# Patient Record
Sex: FEMALE | URBAN - METROPOLITAN AREA
[De-identification: names, ages, dates, MRNs, and addresses within clinical notes are randomized per-mention and may not be internally consistent; named-entity substitution may affect disease eponyms.]

---

## 2022-07-13 ENCOUNTER — OFFICE VISIT (OUTPATIENT)
Dept: URBAN - METROPOLITAN AREA CLINIC 35 | Facility: CLINIC | Age: 42
End: 2022-07-13
Payer: COMMERCIAL

## 2022-07-13 VITALS
WEIGHT: 239 LBS | BODY MASS INDEX: 45.12 KG/M2 | HEART RATE: 88 BPM | DIASTOLIC BLOOD PRESSURE: 78 MMHG | SYSTOLIC BLOOD PRESSURE: 120 MMHG | HEIGHT: 61 IN | OXYGEN SATURATION: 98 %

## 2022-07-13 DIAGNOSIS — K62.5 RECTAL BLEEDING: ICD-10-CM

## 2022-07-13 DIAGNOSIS — D50.0 IRON DEFICIENCY ANEMIA DUE TO CHRONIC BLOOD LOSS: ICD-10-CM

## 2022-07-13 DIAGNOSIS — R13.14 PHARYNGOESOPHAGEAL DYSPHAGIA: ICD-10-CM

## 2022-07-13 DIAGNOSIS — R10.84 GENERALIZED ABDOMINAL PAIN: ICD-10-CM

## 2022-07-13 DIAGNOSIS — K64.8 OTHER HEMORRHOIDS: ICD-10-CM

## 2022-07-13 PROCEDURE — 99204 OFFICE O/P NEW MOD 45 MIN: CPT | Performed by: PHYSICIAN ASSISTANT

## 2022-07-13 RX ORDER — AMITRIPTYLINE HYDROCHLORIDE 10 MG/1
1 TABLET AT BEDTIME TABLET, FILM COATED ORAL ONCE A DAY
Status: ACTIVE | COMMUNITY

## 2022-07-13 RX ORDER — KETOPROFEN 10 %
AS DIRECTED CREAM IN PACKET (GRAM) TOPICAL
Status: ACTIVE | COMMUNITY

## 2022-07-13 RX ORDER — GABAPENTIN 300 MG/1
1 CAPSULE CAPSULE ORAL ONCE A DAY
Status: ACTIVE | COMMUNITY

## 2022-07-13 RX ORDER — METOCLOPRAMIDE 10 MG/1
1 TABLET BEFORE MEALS TABLET ORAL TWICE A DAY
Status: ACTIVE | COMMUNITY

## 2022-07-13 RX ORDER — RIMEGEPANT SULFATE 75 MG/75MG
1 TABLET ON THE TONGUE AND ALLOW TO DISSOLVE TABLET, ORALLY DISINTEGRATING ORAL
Status: ACTIVE | COMMUNITY

## 2022-07-13 RX ORDER — METAXALONE 800 MG/1
1 TABLET TABLET ORAL THREE TIMES A DAY
Status: ACTIVE | COMMUNITY

## 2022-07-13 RX ORDER — RIZATRIPTAN BENZOATE 10 MG/1
1 TABLET TABLET ORAL ONCE A DAY
Status: ACTIVE | COMMUNITY

## 2022-07-13 NOTE — HPI-DYSPHAGIA
She has c/o dysphagia that began at the beginning of this year. Symptoms are intermittent and described as a feeling that her throat is closing. She denies a previous EGD.   She does not feel food is getting stuck.  Liquids are the issue for her, not solids.  She does admit to heartburn only with drinking red wine. She admits to vomiting if she has too much caffeine.  Patient admits a productive cough since 2020 that is residual from having COVID. She admits to a constant throat clearing and occasional coughing spell that occurs daily.

## 2022-07-13 NOTE — HPI-ANEMIA
Patient admits to longstanding ANGELINA since high school, and is scheduled to start iron infusions.  She sees Dr. Cohen, [facility], for her colonoscopies.  She has tried oral supplementation for iron, but that makes her constipated.  She admits to menorrhagia.  She has never had an EGD.

## 2022-07-13 NOTE — HPI-RECTAL BLEEDING
40 y/o female patient presents today for a consultation about rectal bleeding. Bleeding started as a child and happens almost every bowel movement. She admits a longstanding history of constipation and rectal bleeding.  The blood has been bright in color as well as dark on occasions and is present on tissue and within the stool, and enough to stain the water red.   She reports her previous bowel habits were 1 bowel movement every 4-5 days with the daily use of stool softeners. On Sunday her stools were black, Monday she had 3 bowel movements in which she experienced blood clots in bowel movement 1 and 2. She states that Tuesday her stools began to become normal blood was pressent in her stool but on Wednesday she hadn't seen any blood.   Patient currently admits 1 bowel movement per day, with strain. Her stools are soft and formed. She admits mucus or melena in stools. She admits rectal pain or pruritus ani. Patient reports rectal pain is intermittent with each bowel movement but admits pain in her "gluts"   She admits some discomfort with hemorrhoids in which she will use Tucks with witch hazel to help relieve any discomfort.   Admits associated abdominal pain in 3 areas of her abdomen. She report that when she experience LUQ pain - the pain is sharp and lasts for 15-20 minutes. When she experience lower abdominal pain it is more of an ache that will last 2-3 minutes. She also will experience umbilical pain that is sharp and ache that will last for 10 minutes. She denies taking any medications in the form of tablets due to it causing constipation. She will drink a TBSP of Apple Cider Vinegar misxed with water and lemon juice.   Patient c/o gas that is difficult to pass. She report excruciating pain that will radiate to her rectum. She denies taking anything to relieve symptoms. She states that her  will apply pressure to her stomach and she will eventually be able to pass the gas via flatulence.    She has had a colonoscopy in 2019 and 2021, April, due to anemia and rectal bleeding.  Her last colonoscopy was last year with Dr. Cohen which was normal.  She was told she had anemia from her external and internal hemorrhoids per her [facility] physician.  Patient reports that she has been diagnosed with anemia but denies the use of any iron supplements due to it causing constipation. She has been referred to complete iron infusions but she hasn't started due to insurance purposes.

## 2022-07-14 ENCOUNTER — WEB ENCOUNTER (OUTPATIENT)
Dept: URBAN - METROPOLITAN AREA SURGERY CENTER 8 | Facility: SURGERY CENTER | Age: 42
End: 2022-07-14

## 2022-07-20 ENCOUNTER — OFFICE VISIT (OUTPATIENT)
Dept: URBAN - METROPOLITAN AREA SURGERY CENTER 8 | Facility: SURGERY CENTER | Age: 42
End: 2022-07-20
Payer: COMMERCIAL

## 2022-07-20 DIAGNOSIS — B96.81 BACTERIAL INFECTION DUE TO H. PYLORI: ICD-10-CM

## 2022-07-20 DIAGNOSIS — K31.A0 GASTRIC INTESTINAL METAPLASIA: ICD-10-CM

## 2022-07-20 DIAGNOSIS — K29.60 ADENOPAPILLOMATOSIS GASTRICA: ICD-10-CM

## 2022-07-20 DIAGNOSIS — D50.9 ANEMIA: ICD-10-CM

## 2022-07-20 PROCEDURE — G8907 PT DOC NO EVENTS ON DISCHARG: HCPCS | Performed by: INTERNAL MEDICINE

## 2022-07-20 PROCEDURE — 43239 EGD BIOPSY SINGLE/MULTIPLE: CPT | Performed by: INTERNAL MEDICINE

## 2022-08-10 ENCOUNTER — OFFICE VISIT (OUTPATIENT)
Dept: URBAN - METROPOLITAN AREA CLINIC 35 | Facility: CLINIC | Age: 42
End: 2022-08-10

## 2022-08-31 ENCOUNTER — OFFICE VISIT (OUTPATIENT)
Dept: URBAN - METROPOLITAN AREA CLINIC 35 | Facility: CLINIC | Age: 42
End: 2022-08-31

## 2022-09-08 ENCOUNTER — OFFICE VISIT (OUTPATIENT)
Dept: URBAN - METROPOLITAN AREA CLINIC 35 | Facility: CLINIC | Age: 42
End: 2022-09-08
Payer: COMMERCIAL

## 2022-09-08 ENCOUNTER — TELEPHONE ENCOUNTER (OUTPATIENT)
Dept: URBAN - METROPOLITAN AREA CLINIC 35 | Facility: CLINIC | Age: 42
End: 2022-09-08

## 2022-09-08 VITALS
SYSTOLIC BLOOD PRESSURE: 142 MMHG | BODY MASS INDEX: 44.93 KG/M2 | WEIGHT: 238 LBS | OXYGEN SATURATION: 98 % | HEIGHT: 61 IN | DIASTOLIC BLOOD PRESSURE: 92 MMHG | HEART RATE: 75 BPM

## 2022-09-08 DIAGNOSIS — A04.8 H. PYLORI INFECTION: ICD-10-CM

## 2022-09-08 DIAGNOSIS — D50.0 IRON DEFICIENCY ANEMIA DUE TO CHRONIC BLOOD LOSS: ICD-10-CM

## 2022-09-08 DIAGNOSIS — K29.30 CHRONIC SUPERFICIAL GASTRITIS WITHOUT BLEEDING: ICD-10-CM

## 2022-09-08 DIAGNOSIS — R10.84 GENERALIZED ABDOMINAL PAIN: ICD-10-CM

## 2022-09-08 DIAGNOSIS — R13.14 PHARYNGOESOPHAGEAL DYSPHAGIA: ICD-10-CM

## 2022-09-08 DIAGNOSIS — K62.5 RECTAL BLEEDING: ICD-10-CM

## 2022-09-08 PROBLEM — 40739000: Status: ACTIVE | Noted: 2022-07-13

## 2022-09-08 PROCEDURE — 99214 OFFICE O/P EST MOD 30 MIN: CPT | Performed by: PHYSICIAN ASSISTANT

## 2022-09-08 RX ORDER — RIZATRIPTAN BENZOATE 10 MG/1
1 TABLET TABLET ORAL ONCE A DAY
Status: ACTIVE | COMMUNITY

## 2022-09-08 RX ORDER — OMEPRAZOLE 20 MG/1
1 CAPSULE 30 MINUTES BEFORE MORNING MEAL CAPSULE, DELAYED RELEASE ORAL TWICE A DAY
Qty: 28 | Refills: 0 | OUTPATIENT
Start: 2022-09-08

## 2022-09-08 RX ORDER — METRONIDAZOLE 250 MG/1
1 TABLET TABLET ORAL
Qty: 56 TABLET | Refills: 0 | OUTPATIENT
Start: 2022-09-08 | End: 2022-09-22

## 2022-09-08 RX ORDER — TETRACYCLINE HYDROCHLORIDE 500 MG/1
1 CAPSULE ON AN EMPTY STOMACH CAPSULE ORAL EVERY 6 HOURS
Qty: 56 CAPSULE | Refills: 0 | OUTPATIENT
Start: 2022-09-08 | End: 2022-09-22

## 2022-09-08 RX ORDER — GABAPENTIN 300 MG/1
1 CAPSULE CAPSULE ORAL ONCE A DAY
Status: ACTIVE | COMMUNITY

## 2022-09-08 RX ORDER — METOCLOPRAMIDE 10 MG/1
1 TABLET BEFORE MEALS TABLET ORAL TWICE A DAY
Status: ACTIVE | COMMUNITY

## 2022-09-08 RX ORDER — RIMEGEPANT SULFATE 75 MG/75MG
1 TABLET ON THE TONGUE AND ALLOW TO DISSOLVE TABLET, ORALLY DISINTEGRATING ORAL
Status: ACTIVE | COMMUNITY

## 2022-09-08 RX ORDER — AMITRIPTYLINE HYDROCHLORIDE 10 MG/1
1 TABLET AT BEDTIME TABLET, FILM COATED ORAL ONCE A DAY
Status: ACTIVE | COMMUNITY

## 2022-09-08 RX ORDER — KETOPROFEN 10 %
AS DIRECTED CREAM IN PACKET (GRAM) TOPICAL
Status: ACTIVE | COMMUNITY

## 2022-09-08 RX ORDER — METAXALONE 800 MG/1
1 TABLET TABLET ORAL THREE TIMES A DAY
Status: ACTIVE | COMMUNITY

## 2022-09-08 NOTE — HPI-DYSPHAGIA
Patient stopped taking medication, she does not recall the medication name, but her symtomps has been improved. Patient still admits to constant clearing her throat and has cough throughout the day but especially at night. Patient was prescribed inhaler from pulmonologist, (early onset of asthma) but patient did not feel like it was neccessary.   (last visit 7/13/2022) She has c/o dysphagia that began at the beginning of this year. Symptoms are intermittent and described as a feeling that her throat is closing. She denies a previous EGD.   She does not feel food is getting stuck.  Liquids are the issue for her, not solids.  She does admit to heartburn only with drinking red wine. She admits to vomiting if she has too much caffeine.  Patient admits a productive cough since 2020 that is residual from having COVID. She admits to a constant throat clearing and occasional coughing spell that occurs daily.  (once patient stopped taking medication - everything helped as well. needs to keep clearing her throat and has cough throughout the day but especially at night. Patient was prescribed inhaler from pulmonologist but patient did not feel like it was neccessary.

## 2022-09-08 NOTE — HPI-ENDOSCOPY (EGD) FOLLOWUP
41 year old female presents today for follow up to her EGD.  Since the procedure patient denies dysphagia, globus, changes in appetite, and changes in bowel habits. Patient denies complications after the procedure.   Last Visit ( 07/20/2022 ) Dr. Chaney  Impressions:   Z-line regular, 37 cm from the incisors. - Normal esophagus. - Gastric mucosal atrophy.  Biopsied. - Normal antrum.  Biopsied. - Normal examined duodenum.  Biopsied.  PATHOLOGY REPORT FINAL DIAGNOSIS: -Benign small bowel mucosa with reactive/regenerative changes and gastric metaplasia which may be seen in peptic injury. -Negative for increased intraepithelial lymphocytes. A. Duodenum, Second Part, Third Part, Biopsy -Chronic active gastritis, moderate. -POSITIVE for Helicobacter-like organisms identified on Giemsa stain. -Negative for intestinal metaplasia on Alcian Blue/PAS stain. -Negative for gastric atrophy, dysplasia, or malignancy. B. Stomach, Antrum, Biopsy -Chronic active gastritis, moderate, with patchy atrophy. -POSITIVE for Helicobacter-like organisms identified on Giemsa stain. -Negative for intestinal metaplasia on Alcian Blue/PAS stain. -Negative for dysplasia or malignancy. C. Stomach, Body, Biopsy  Tissue committee code 2:  A microscopic examination is performed for all specimen parts. The  H&E  stain  for  each  specimen  has  been  reviewed.    The  pathologist  then  made  the determination  that  the  below  stains  were  necessary  to  exclude  certain  pathological conditions.  Special stain controls are performed and show appropriate reactivity. SPECIAL STAINS                              RESULTS 1. Giemsa       B. Stomach, antrum                POSITIVE for Helicobacter-like organisms       C.  Stomach, body                   POSITIVE for Helicobacter-like organisms 2.  Alcian Blue/PAS       B.  Stomach, antrum                Negative for intestinal metaplasia       C.  Stomach, body                    Negative for intestinal metaplasia   Last Visit ( 07/20/2022) 1. Iron deficiency anemia due to chronic blood loss PROCEDURE: EGD with Dilation Notes: 1. Continued care with Dr. Cohen, last colon April 2021 was normal 2. Iron infusions 3. Obtain latest labs.

## 2022-09-08 NOTE — HPI-ANEMIA
Patient states she had Iron infusion 3 weeks ago and had a follow up 2 weeks ago with her doctor.  Total of 5 sessions was completed and the blood count went from 9 to 11.  Patient states her dizziness decreased but once in a while, she still has some symptoms. Patient also stopped taking iron supplement due to constipation.   (Last visit) Patient admits to longstanding ANGELINA since high school, and is scheduled to start iron infusions.  She sees Dr. Cohen, [facility], for her colonoscopies.  She has tried oral supplementation for iron, but that makes her constipated.  She admits to menorrhagia.  She has never had an EGD.

## 2022-09-08 NOTE — HPI-RECTAL BLEEDING
Patient denies Rectal bleeding, states it completely stopped.  (last visit 7/13/2022) 42 y/o female patient presents today for a consultation about rectal bleeding. Bleeding started as a child and happens almost every bowel movement. She admits a longstanding history of constipation and rectal bleeding.  The blood has been bright in color as well as dark on occasions and is present on tissue and within the stool, and enough to stain the water red.   She reports her previous bowel habits were 1 bowel movement every 4-5 days with the daily use of stool softeners. On Sunday her stools were black, Monday she had 3 bowel movements in which she experienced blood clots in bowel movement 1 and 2. She states that Tuesday her stools began to become normal blood was pressent in her stool but on Wednesday she hadn't seen any blood.   Patient currently admits 1 bowel movement per day, with strain. Her stools are soft and formed. She admits mucus or melena in stools. She admits rectal pain or pruritus ani. Patient reports rectal pain is intermittent with each bowel movement but admits pain in her "gluts"   She admits some discomfort with hemorrhoids in which she will use Tucks with witch hazel to help relieve any discomfort.   Admits associated abdominal pain in 3 areas of her abdomen. She report that when she experience LUQ pain - the pain is sharp and lasts for 15-20 minutes. When she experience lower abdominal pain it is more of an ache that will last 2-3 minutes. She also will experience umbilical pain that is sharp and ache that will last for 10 minutes. She denies taking any medications in the form of tablets due to it causing constipation. She will drink a TBSP of Apple Cider Vinegar misxed with water and lemon juice.   Patient c/o gas that is difficult to pass. She report excruciating pain that will radiate to her rectum. She denies taking anything to relieve symptoms. She states that her  will apply pressure to her stomach and she will eventually be able to pass the gas via flatulence.    She has had a colonoscopy in 2019 and 2021, April, due to anemia and rectal bleeding.  Her last colonoscopy was last year with Dr. Cohen which was normal.  She was told she had anemia from her external and internal hemorrhoids per her CRS physician.  Patient reports that she has been diagnosed with anemia but denies the use of any iron supplements due to it causing constipation. She has been referred to complete iron infusions but she hasn't started due to insurance purposes.

## 2022-09-22 ENCOUNTER — OFFICE VISIT (OUTPATIENT)
Dept: URBAN - METROPOLITAN AREA CLINIC 32 | Facility: CLINIC | Age: 42
End: 2022-09-22

## 2022-10-04 ENCOUNTER — OFFICE VISIT (OUTPATIENT)
Dept: URBAN - METROPOLITAN AREA CLINIC 32 | Facility: CLINIC | Age: 42
End: 2022-10-04
Payer: COMMERCIAL

## 2022-10-04 ENCOUNTER — TELEPHONE ENCOUNTER (OUTPATIENT)
Dept: URBAN - METROPOLITAN AREA CLINIC 36 | Facility: CLINIC | Age: 42
End: 2022-10-04

## 2022-10-04 DIAGNOSIS — D50.9 ANEMIA: ICD-10-CM

## 2022-10-04 PROCEDURE — 91110 GI TRC IMG INTRAL ESOPH-ILE: CPT | Performed by: INTERNAL MEDICINE

## 2022-10-10 ENCOUNTER — TELEPHONE ENCOUNTER (OUTPATIENT)
Dept: URBAN - METROPOLITAN AREA CLINIC 36 | Facility: CLINIC | Age: 42
End: 2022-10-10

## 2022-10-12 ENCOUNTER — TELEPHONE ENCOUNTER (OUTPATIENT)
Dept: URBAN - METROPOLITAN AREA CLINIC 35 | Facility: CLINIC | Age: 42
End: 2022-10-12

## 2022-10-13 ENCOUNTER — OFFICE VISIT (OUTPATIENT)
Dept: URBAN - METROPOLITAN AREA CLINIC 35 | Facility: CLINIC | Age: 42
End: 2022-10-13

## 2022-10-21 ENCOUNTER — DASHBOARD ENCOUNTERS (OUTPATIENT)
Age: 42
End: 2022-10-21

## 2022-10-21 ENCOUNTER — TELEPHONE ENCOUNTER (OUTPATIENT)
Dept: URBAN - METROPOLITAN AREA CLINIC 35 | Facility: CLINIC | Age: 42
End: 2022-10-21

## 2022-10-21 ENCOUNTER — OFFICE VISIT (OUTPATIENT)
Dept: URBAN - METROPOLITAN AREA CLINIC 35 | Facility: CLINIC | Age: 42
End: 2022-10-21
Payer: COMMERCIAL

## 2022-10-21 VITALS
SYSTOLIC BLOOD PRESSURE: 144 MMHG | WEIGHT: 243 LBS | HEART RATE: 84 BPM | BODY MASS INDEX: 45.88 KG/M2 | DIASTOLIC BLOOD PRESSURE: 98 MMHG | HEIGHT: 61 IN | OXYGEN SATURATION: 99 %

## 2022-10-21 DIAGNOSIS — K62.5 RECTAL BLEEDING: ICD-10-CM

## 2022-10-21 DIAGNOSIS — A04.8 H. PYLORI INFECTION: ICD-10-CM

## 2022-10-21 DIAGNOSIS — K29.30 CHRONIC SUPERFICIAL GASTRITIS WITHOUT BLEEDING: ICD-10-CM

## 2022-10-21 DIAGNOSIS — D50.0 IRON DEFICIENCY ANEMIA DUE TO CHRONIC BLOOD LOSS: ICD-10-CM

## 2022-10-21 PROBLEM — 724556004: Status: ACTIVE | Noted: 2022-07-13

## 2022-10-21 PROBLEM — 196735001: Status: ACTIVE | Noted: 2022-09-08

## 2022-10-21 PROCEDURE — 99214 OFFICE O/P EST MOD 30 MIN: CPT | Performed by: PHYSICIAN ASSISTANT

## 2022-10-21 RX ORDER — OMEPRAZOLE 20 MG/1
1 CAPSULE 30 MINUTES BEFORE MORNING MEAL CAPSULE, DELAYED RELEASE ORAL TWICE A DAY
Qty: 28 | Refills: 0 | OUTPATIENT

## 2022-10-21 RX ORDER — FERROUS FUMARATE AND POLYSACCHRIDE IRON VITAMIN MINERAL COMPLEX SUPPLEMENT 191.2; 135.9; 1; 210; 20; 5; 5; 7; 25; 3 MG/1; MG/1; MG/1; MG/1; MG/1; MG/1; MG/1; MG/1; MG/1; UG/1
1 CAPSULE CAPSULE ORAL ONCE A DAY
Qty: 30 | Refills: 3 | OUTPATIENT
Start: 2022-10-21

## 2022-10-21 RX ORDER — METAXALONE 800 MG/1
1 TABLET TABLET ORAL THREE TIMES A DAY
Status: ACTIVE | COMMUNITY

## 2022-10-21 RX ORDER — METOCLOPRAMIDE 10 MG/1
1 TABLET BEFORE MEALS TABLET ORAL TWICE A DAY
Status: ACTIVE | COMMUNITY

## 2022-10-21 RX ORDER — RIZATRIPTAN BENZOATE 10 MG/1
1 TABLET TABLET ORAL ONCE A DAY
Status: ACTIVE | COMMUNITY

## 2022-10-21 RX ORDER — OMEPRAZOLE 20 MG/1
1 CAPSULE 30 MINUTES BEFORE MORNING MEAL CAPSULE, DELAYED RELEASE ORAL TWICE A DAY
Qty: 28 | Refills: 0 | Status: ACTIVE | COMMUNITY
Start: 2022-09-08

## 2022-10-21 RX ORDER — KETOPROFEN 10 %
AS DIRECTED CREAM IN PACKET (GRAM) TOPICAL
Status: ACTIVE | COMMUNITY

## 2022-10-21 RX ORDER — RIMEGEPANT SULFATE 75 MG/75MG
1 TABLET ON THE TONGUE AND ALLOW TO DISSOLVE TABLET, ORALLY DISINTEGRATING ORAL
Status: ACTIVE | COMMUNITY

## 2022-10-21 RX ORDER — GABAPENTIN 300 MG/1
1 CAPSULE CAPSULE ORAL ONCE A DAY
Status: ACTIVE | COMMUNITY

## 2022-10-21 RX ORDER — AMITRIPTYLINE HYDROCHLORIDE 10 MG/1
1 TABLET AT BEDTIME TABLET, FILM COATED ORAL ONCE A DAY
Status: ACTIVE | COMMUNITY

## 2022-10-21 NOTE — HPI-CAPSULE STUDY
42 year old female patient presents today for a follow up of her PillCam.   Report shows: -Gastric erosion possibly suggestive of mild gastritis/gastropathy. -Nonspecific diminutive red marks in the small bowel some of which may represent diminutive erosions or diminutive angioectasia. 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 -Possible Colonic polyp.